# Patient Record
(demographics unavailable — no encounter records)

---

## 2025-06-24 NOTE — HISTORY OF PRESENT ILLNESS
[de-identified] : The patient is a 67 year  old RT hand dominant male who presents today complaining of RT Knee pain.   Date of Injury/Onset: 6/1/25 Pain:    At Rest: 1/10  With Activity:  5/10  Mechanism of injury: gradual onset, no specific JOCELYN This is NOT a Work Related Injury being treated under Worker's Compensation. This is NOT an athletic injury occurring associated with an interscholastic or organized sports team. Quality of symptoms: sharp anterior medial knee pain, clicking  Improves with: rest, NSAIDs, ice Worse with: walking, pivoting/twisting of right foot/knee Prior treatment: none Prior Imaging: none Out of work/sport: not currently working  School/Sport/Position/Occupation: retired : Golf Additional Information: None

## 2025-06-24 NOTE — IMAGING
[Right] : right knee [de-identified] : The patient is a well appearing 67 year old male of their stated age. Patient ambulates with a normal gait. Negative straight leg raise bilateral.   Effected Knee: RIGHT ROM:  0-130 degrees Lachman: Negative Pivot Shift: Negative Anterior Drawer: Negative Posterior Drawer / Sag: Negative Varus Stress 0 degrees: Stable Varus Stress 30 degrees: Stable Valgus Stress 0 degrees: WITH PAIN Valgus Stress 30 degrees: WITH PAIN Medial Austin: POSITIVE Lateral Austin: Negative Patella Glide: 2+ Patella Apprehension: Negative Patella Grind: Negative Pes Lucien Valgus: Negative Pes Cavus: Negative Triple Hop: Negative Squat Jump: Negative   Palpation: Medial Joint Line: TENDER Lateral Joint Line: Nontender Medial Collateral Ligament: TENDER Lateral Collateral Ligament/PLC: Nontender Distal Femur: Nontender Proximal Tibia: Nontender Tibial Tubercle: Nontender Gerdy's Tubercle: Nontender Distal Pole Patella: Nontender Quadriceps Tendon: Nontender & Intact Patella Tendon: Nontender & Intact Medial Femoral Condyle: Nontender Medial Distal Hamstring/PES: Nontender Lateral Distal Hamstring: Nontender & Stable Iliotibial Band: Nontender Medial Patellofemoral Ligament: Nontender Adductor: Nontender Proximal GSC-Plantaris: Nontender Calf: Supple & Nontender   Inspection: Deformity: No Erythema: No Ecchymosis: No Abrasions: No Effusion: No Prepatellar Bursitis: No Neurologic Exam: Sensation L4-S1: Grossly Intact   Motor Exam: Quadriceps: 5 out of 5 Hamstrings: 5 out of 5 EHL: 5 out of 5 FHL: 5 out of 5 TA: 5 out of 5 GS: 5 out of 5 Circulatory/Pulses: Dorsalis Pedis: 2+ Posterior Tibialis: 2+ Additional Pertinent Findings: None   Contralateral Knee:                        ROM: 0-145 degrees Other Pertinent Findings: None   Assessment: The patient is a 67 year old male with right knee pain and radiographic and physical exam findings consistent with possible medial collateral ligament sprain vs medial meniscus tear. The patient's condition is acute. Documents/Results Reviewed Today: X-ray right knee Tests/Studies Independently Interpreted Today: X-ray right knee revealed evidence of mild medial joint line narrowing.  Pertinent findings include: +MJLT, +medial austin, pain with valgus stress, pain at distal medial collateral ligament. Confounding medical conditions/concerns: None  Plan: Due to worsening pain and instability with mechanical symptoms, the patient will obtain MRI right knee to evaluate for possible MCL sprain vs medial meniscus tear. Patient has proven refractory to all previous conservative treatments including but not limited to physical therapy, home exercises, activity modifications, rest, ice, oral anti-inflammatories etc. In the interim, discussed taking OTC anti-inflammatories as needed - use as directed. Modify activity as discussed. Tests Ordered: MRI right knee Prescription Medications Ordered: Discussed appropriate use of OTC anti-inflammatories and analgesics (including but not limited to Aleve, Advil, Tylenol, Motrin, Ibuprofen, Voltaren gel, etc.) Braces/DME Ordered: continue knee compression sleeve Activity/Work/Sports Status: None Additional Instructions: None Follow-Up: after MRI  The patient's current medication management of their orthopedic diagnosis was reviewed today: The patient declined and/or was contraindicated for the recommended prescription medication Naprosyn and will use over the counter Advil, Aleve, Voltaren Gel or Tylenol as directed. (1) We discussed a comprehensive treatment plan that included possible pharmaceutical management involving the use of prescription strength medications including but not limited to options such as oral Naprosyn 500mg BID, once daily Meloxicam 15 mg, or 500-650 mg Tylenol versus over the counter oral medications and topical prescription NSAID Pennsaid vs over the counter Voltaren gel.  Based on our extensive discussion, the patient declined prescription medication and will use over the counter Advil, Alleve, Voltaren Gel or Tylenol as directed. (2) There is a moderate risk of morbidity with further treatment, especially from use of prescription strength medications and possible side effects of these medications which include upset stomach with oral medications, skin reactions to topical medications and cardiac/renal issues with long term use. (3) I recommended that the patient follow-up with their medical physician to discuss any significant specific potential issues with long term medication use such as interactions with current medications or with exacerbation of underlying medical comorbidities. (4) The benefits and risks associated with use of injectable, oral or topical, prescription and over the counter anti-inflammatory medications were discussed with the patient. The patient voiced understanding of the risks including but not limited to bleeding, stroke, kidney dysfunction, heart disease, and were referred to the black box warning label for further information.  I, Doc Montez, attest that this documentation has been prepared under the direction and in the presence of Provider Dr. Bam Nance.  The documentation recorded by the scribe accurately reflects the services I, Dr. Bam Nance, personally performed and the decisions made by me.  [FreeTextEntry9] : x-ray right knee revealed evidence of mild medial joint line narrowing.

## 2025-06-24 NOTE — IMAGING
[Right] : right knee [de-identified] : The patient is a well appearing 67 year old male of their stated age. Patient ambulates with a normal gait. Negative straight leg raise bilateral.   Effected Knee: RIGHT ROM:  0-130 degrees Lachman: Negative Pivot Shift: Negative Anterior Drawer: Negative Posterior Drawer / Sag: Negative Varus Stress 0 degrees: Stable Varus Stress 30 degrees: Stable Valgus Stress 0 degrees: WITH PAIN Valgus Stress 30 degrees: WITH PAIN Medial Austin: POSITIVE Lateral Austin: Negative Patella Glide: 2+ Patella Apprehension: Negative Patella Grind: Negative Pes [place] Valgus: Negative Pes Cavus: Negative Triple Hop: Negative Squat Jump: Negative   Palpation: Medial Joint Line: TENDER Lateral Joint Line: Nontender Medial Collateral Ligament: TENDER Lateral Collateral Ligament/PLC: Nontender Distal Femur: Nontender Proximal Tibia: Nontender Tibial Tubercle: Nontender Gerdy's Tubercle: Nontender Distal Pole Patella: Nontender Quadriceps Tendon: Nontender & Intact Patella Tendon: Nontender & Intact Medial Femoral Condyle: Nontender Medial Distal Hamstring/PES: Nontender Lateral Distal Hamstring: Nontender & Stable Iliotibial Band: Nontender Medial Patellofemoral Ligament: Nontender Adductor: Nontender Proximal GSC-Plantaris: Nontender Calf: Supple & Nontender   Inspection: Deformity: No Erythema: No Ecchymosis: No Abrasions: No Effusion: No Prepatellar Bursitis: No Neurologic Exam: Sensation L4-S1: Grossly Intact   Motor Exam: Quadriceps: 5 out of 5 Hamstrings: 5 out of 5 EHL: 5 out of 5 FHL: 5 out of 5 TA: 5 out of 5 GS: 5 out of 5 Circulatory/Pulses: Dorsalis Pedis: 2+ Posterior Tibialis: 2+ Additional Pertinent Findings: None   Contralateral Knee:                        ROM: 0-145 degrees Other Pertinent Findings: None   Assessment: The patient is a 67 year old male with right knee pain and radiographic and physical exam findings consistent with possible medial collateral ligament sprain vs medial meniscus tear. The patient's condition is acute. Documents/Results Reviewed Today: X-ray right knee Tests/Studies Independently Interpreted Today: X-ray right knee revealed evidence of mild medial joint line narrowing.  Pertinent findings include: +MJLT, +medial austin, pain with valgus stress, pain at distal medial collateral ligament. Confounding medical conditions/concerns: None  Plan: Due to worsening pain and instability with mechanical symptoms, the patient will obtain MRI right knee to evaluate for possible MCL sprain vs medial meniscus tear. Patient has proven refractory to all previous conservative treatments including but not limited to physical therapy, home exercises, activity modifications, rest, ice, oral anti-inflammatories etc. In the interim, discussed taking OTC anti-inflammatories as needed - use as directed. Modify activity as discussed. Tests Ordered: MRI right knee Prescription Medications Ordered: Discussed appropriate use of OTC anti-inflammatories and analgesics (including but not limited to Aleve, Advil, Tylenol, Motrin, Ibuprofen, Voltaren gel, etc.) Braces/DME Ordered: continue knee compression sleeve Activity/Work/Sports Status: None Additional Instructions: None Follow-Up: after MRI  The patient's current medication management of their orthopedic diagnosis was reviewed today: The patient declined and/or was contraindicated for the recommended prescription medication Naprosyn and will use over the counter Advil, Aleve, Voltaren Gel or Tylenol as directed. (1) We discussed a comprehensive treatment plan that included possible pharmaceutical management involving the use of prescription strength medications including but not limited to options such as oral Naprosyn 500mg BID, once daily Meloxicam 15 mg, or 500-650 mg Tylenol versus over the counter oral medications and topical prescription NSAID Pennsaid vs over the counter Voltaren gel.  Based on our extensive discussion, the patient declined prescription medication and will use over the counter Advil, Alleve, Voltaren Gel or Tylenol as directed. (2) There is a moderate risk of morbidity with further treatment, especially from use of prescription strength medications and possible side effects of these medications which include upset stomach with oral medications, skin reactions to topical medications and cardiac/renal issues with long term use. (3) I recommended that the patient follow-up with their medical physician to discuss any significant specific potential issues with long term medication use such as interactions with current medications or with exacerbation of underlying medical comorbidities. (4) The benefits and risks associated with use of injectable, oral or topical, prescription and over the counter anti-inflammatory medications were discussed with the patient. The patient voiced understanding of the risks including but not limited to bleeding, stroke, kidney dysfunction, heart disease, and were referred to the black box warning label for further information.  I, Doc Montez, attest that this documentation has been prepared under the direction and in the presence of Provider Dr. Bam Nance.  The documentation recorded by the scribe accurately reflects the services I, Dr. Bam Nance, personally performed and the decisions made by me.  [FreeTextEntry9] : x-ray right knee revealed evidence of mild medial joint line narrowing.

## 2025-06-24 NOTE — HISTORY OF PRESENT ILLNESS
[de-identified] : The patient is a 67 year  old RT hand dominant male who presents today complaining of RT Knee pain.   Date of Injury/Onset: 6/1/25 Pain:    At Rest: 1/10  With Activity:  5/10  Mechanism of injury: gradual onset, no specific JOCELYN This is NOT a Work Related Injury being treated under Worker's Compensation. This is NOT an athletic injury occurring associated with an interscholastic or organized sports team. Quality of symptoms: sharp anterior medial knee pain, clicking  Improves with: rest, NSAIDs, ice Worse with: walking, pivoting/twisting of right foot/knee Prior treatment: none Prior Imaging: none Out of work/sport: not currently working  School/Sport/Position/Occupation: retired : Golf Additional Information: None